# Patient Record
Sex: FEMALE | Race: WHITE | NOT HISPANIC OR LATINO | URBAN - NONMETROPOLITAN AREA
[De-identification: names, ages, dates, MRNs, and addresses within clinical notes are randomized per-mention and may not be internally consistent; named-entity substitution may affect disease eponyms.]

---

## 2019-11-18 ENCOUNTER — IMPORTED ENCOUNTER (OUTPATIENT)
Dept: URBAN - NONMETROPOLITAN AREA CLINIC 1 | Facility: CLINIC | Age: 49
End: 2019-11-18

## 2019-11-18 PROBLEM — H52.223: Noted: 2019-11-18

## 2019-11-18 PROBLEM — H43.822: Noted: 2019-11-18

## 2019-11-18 PROBLEM — H40.053: Noted: 2019-11-18

## 2019-11-18 PROBLEM — H52.4: Noted: 2019-11-18

## 2019-11-18 PROBLEM — H40.039: Noted: 2019-11-18

## 2019-11-18 PROBLEM — H52.03: Noted: 2019-11-18

## 2019-11-18 PROCEDURE — 92015 DETERMINE REFRACTIVE STATE: CPT

## 2019-11-18 PROCEDURE — 92014 COMPRE OPH EXAM EST PT 1/>: CPT

## 2019-11-18 NOTE — PATIENT DISCUSSION
Compound Hyperopic Astigmatism OU -  discussed findings w/patient-  new spectacle Rx issued-  monitor yearly or prn Narrow Angle Anatomy-  discussed findings w/patient-  Explained narrow anatomy and what it can mean if it is left untreated. -  Reviewed the signs and symptoms of a narrow angle attack. -  Discussed RBAs of Laser Peripheral Iridotomy and recommend seeing Dr. Christy Sheets for evaluation and patient wishes to proceed.  -  Will schedule at patients convenience

## 2019-12-03 ENCOUNTER — TRANSCRIPTION ENCOUNTER (OUTPATIENT)
Age: 49
End: 2019-12-03

## 2021-03-29 ENCOUNTER — IMPORTED ENCOUNTER (OUTPATIENT)
Dept: URBAN - NONMETROPOLITAN AREA CLINIC 1 | Facility: CLINIC | Age: 51
End: 2021-03-29

## 2021-03-29 PROCEDURE — 92015 DETERMINE REFRACTIVE STATE: CPT

## 2021-03-29 PROCEDURE — 92014 COMPRE OPH EXAM EST PT 1/>: CPT

## 2022-04-09 ASSESSMENT — TONOMETRY
OS_IOP_MMHG: 16
OD_IOP_MMHG: 15
OS_IOP_MMHG: 16
OD_IOP_MMHG: 16

## 2022-04-09 ASSESSMENT — VISUAL ACUITY
OD_SC: 20/20-1
OS_SC: 20/20-1
OS_SC: 20/20-2
OU_SC: 20/20
OU_CC: 20/20
OD_SC: 20/20

## 2022-04-15 ENCOUNTER — CONSULTATION/EVALUATION (OUTPATIENT)
Dept: URBAN - NONMETROPOLITAN AREA CLINIC 4 | Facility: CLINIC | Age: 52
End: 2022-04-15

## 2022-04-15 DIAGNOSIS — H40.033: ICD-10-CM

## 2022-04-15 PROCEDURE — 92012 INTRM OPH EXAM EST PATIENT: CPT

## 2022-04-15 PROCEDURE — 66761 REVISION OF IRIS: CPT

## 2022-04-15 ASSESSMENT — TONOMETRY
OS_IOP_MMHG: 15
OD_IOP_MMHG: 16

## 2022-04-15 ASSESSMENT — VISUAL ACUITY
OD_CC: 20/20
OS_CC: 20/20

## 2022-04-15 NOTE — PROCEDURE NOTE: CLINICAL
PROCEDURE NOTE: Laser Peripheral Iridotomy OD. Anesthesia: Topical. The purpose and nature of the procedure, possible alternative methods of treatment, the risks involved and the possibility of complications were discussed with patient. The Patient wishes to proceed and the consent was signed. 1 gtt Prolensa applied. The laser was then performed under topical anesthesia with no complications. Post op instructions were given to patient as well as a follow-up appointment. Patient was advised to call our office if any questions or concerns. Holley Franklin

## 2022-05-04 ENCOUNTER — POST-OP (OUTPATIENT)
Dept: URBAN - NONMETROPOLITAN AREA CLINIC 4 | Facility: CLINIC | Age: 52
End: 2022-05-04

## 2022-05-04 DIAGNOSIS — H52.223: ICD-10-CM

## 2022-05-04 DIAGNOSIS — Z98.890: ICD-10-CM

## 2022-05-04 DIAGNOSIS — H52.03: ICD-10-CM

## 2022-05-04 PROCEDURE — 99024 POSTOP FOLLOW-UP VISIT: CPT

## 2022-05-04 ASSESSMENT — TONOMETRY
OD_IOP_MMHG: 17
OS_IOP_MMHG: 17

## 2022-05-04 ASSESSMENT — VISUAL ACUITY
OS_CC: 20/20
OU_CC: 20/20
OD_CC: 20/20

## 2022-08-30 ENCOUNTER — COMPREHENSIVE EXAM (OUTPATIENT)
Dept: URBAN - NONMETROPOLITAN AREA CLINIC 4 | Facility: CLINIC | Age: 52
End: 2022-08-30

## 2022-08-30 DIAGNOSIS — H40.1131: ICD-10-CM

## 2022-08-30 PROCEDURE — 92014 COMPRE OPH EXAM EST PT 1/>: CPT

## 2022-08-30 ASSESSMENT — TONOMETRY
OS_IOP_MMHG: 18
OD_IOP_MMHG: 17

## 2022-08-30 ASSESSMENT — VISUAL ACUITY
OS_SC: 20/20
OU_CC: J1+
OD_SC: 20/25
OU_SC: 20/20

## 2022-11-09 ENCOUNTER — CONSULTATION/EVALUATION (OUTPATIENT)
Dept: URBAN - NONMETROPOLITAN AREA CLINIC 4 | Facility: CLINIC | Age: 52
End: 2022-11-09

## 2022-11-09 DIAGNOSIS — H40.033: ICD-10-CM

## 2022-11-09 PROCEDURE — 66761 REVISION OF IRIS: CPT

## 2022-11-09 ASSESSMENT — VISUAL ACUITY
OS_CC: 20/20
OD_CC: 20/25-2

## 2022-11-09 ASSESSMENT — TONOMETRY
OD_IOP_MMHG: 19
OS_IOP_MMHG: 20

## 2022-11-09 NOTE — PROCEDURE NOTE: CLINICAL
PROCEDURE NOTE: Laser Peripheral Iridotomy OS. Diagnosis: Anatomic Narrow Angles. Anesthesia: Topical. The purpose and nature of the procedure, possible alternative methods of treatment, the risks involved and the possibility of complications were discussed with patient. The Patient wishes to proceed and the consent was signed. 1 gtt Prolensa applied. The laser was then performed under topical anesthesia with no complications. Post op instructions were given to patient as well as a follow-up appointment. Patient was advised to call our office if any questions or concerns. .2 mJ # 4 shots.

## 2024-04-08 ENCOUNTER — COMPREHENSIVE EXAM (OUTPATIENT)
Dept: URBAN - NONMETROPOLITAN AREA CLINIC 4 | Facility: CLINIC | Age: 54
End: 2024-04-08

## 2024-04-08 DIAGNOSIS — H40.033: ICD-10-CM

## 2024-04-08 DIAGNOSIS — Z98.890: ICD-10-CM

## 2024-04-08 DIAGNOSIS — H52.4: ICD-10-CM

## 2024-04-08 DIAGNOSIS — H40.013: ICD-10-CM

## 2024-04-08 DIAGNOSIS — H43.822: ICD-10-CM

## 2024-04-08 DIAGNOSIS — H52.223: ICD-10-CM

## 2024-04-08 PROCEDURE — 92015 DETERMINE REFRACTIVE STATE: CPT

## 2024-04-08 PROCEDURE — 92133 CPTRZD OPH DX IMG PST SGM ON: CPT

## 2024-04-08 PROCEDURE — 92014 COMPRE OPH EXAM EST PT 1/>: CPT

## 2024-04-08 ASSESSMENT — TONOMETRY
OD_IOP_MMHG: 17
OS_IOP_MMHG: 17

## 2024-04-08 ASSESSMENT — VISUAL ACUITY
OS_SC: 20/40
OS_PH: 20/25-2
OD_PH: 20/25
OD_SC: 20/60

## 2025-07-14 ENCOUNTER — COMPREHENSIVE EXAM (OUTPATIENT)
Age: 55
End: 2025-07-14

## 2025-07-14 DIAGNOSIS — H52.4: ICD-10-CM

## 2025-07-14 DIAGNOSIS — H40.013: ICD-10-CM

## 2025-07-14 DIAGNOSIS — H52.13: ICD-10-CM

## 2025-07-14 DIAGNOSIS — H52.223: ICD-10-CM

## 2025-07-14 PROCEDURE — 92015 DETERMINE REFRACTIVE STATE: CPT

## 2025-07-14 PROCEDURE — 92133 CPTRZD OPH DX IMG PST SGM ON: CPT

## 2025-07-14 PROCEDURE — 92012 INTRM OPH EXAM EST PATIENT: CPT
